# Patient Record
Sex: FEMALE | Race: WHITE | Employment: STUDENT | URBAN - METROPOLITAN AREA
[De-identification: names, ages, dates, MRNs, and addresses within clinical notes are randomized per-mention and may not be internally consistent; named-entity substitution may affect disease eponyms.]

---
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
## 2018-02-12 RX ORDER — SPIRONOLACTONE 50 MG/1
TABLET, FILM COATED ORAL
Qty: 60 TABLET | Refills: 0 | OUTPATIENT
Start: 2018-02-12

## 2018-07-09 ENCOUNTER — OFFICE VISIT (OUTPATIENT)
Dept: FAMILY MEDICINE | Facility: CLINIC | Age: 18
End: 2018-07-09
Payer: OTHER GOVERNMENT

## 2018-07-09 VITALS
TEMPERATURE: 98.5 F | HEIGHT: 67 IN | BODY MASS INDEX: 22.91 KG/M2 | RESPIRATION RATE: 20 BRPM | WEIGHT: 146 LBS | HEART RATE: 91 BPM | SYSTOLIC BLOOD PRESSURE: 111 MMHG | DIASTOLIC BLOOD PRESSURE: 74 MMHG

## 2018-07-09 DIAGNOSIS — Z00.00 VISIT FOR PREVENTIVE HEALTH EXAMINATION: Primary | ICD-10-CM

## 2018-07-09 DIAGNOSIS — Z30.41 ENCOUNTER FOR SURVEILLANCE OF CONTRACEPTIVE PILLS: ICD-10-CM

## 2018-07-09 DIAGNOSIS — Z11.3 SCREENING EXAMINATION FOR VENEREAL DISEASE: ICD-10-CM

## 2018-07-09 DIAGNOSIS — G43.009 MIGRAINE WITHOUT AURA AND WITHOUT STATUS MIGRAINOSUS, NOT INTRACTABLE: ICD-10-CM

## 2018-07-09 PROCEDURE — 87491 CHLMYD TRACH DNA AMP PROBE: CPT | Performed by: PHYSICIAN ASSISTANT

## 2018-07-09 PROCEDURE — 87591 N.GONORRHOEAE DNA AMP PROB: CPT | Performed by: PHYSICIAN ASSISTANT

## 2018-07-09 PROCEDURE — 99213 OFFICE O/P EST LOW 20 MIN: CPT | Mod: 25 | Performed by: PHYSICIAN ASSISTANT

## 2018-07-09 PROCEDURE — 99395 PREV VISIT EST AGE 18-39: CPT | Performed by: PHYSICIAN ASSISTANT

## 2018-07-09 RX ORDER — TIMOLOL MALEATE 5 MG/ML
1 SOLUTION/ DROPS OPHTHALMIC DAILY
Qty: 84 TABLET | Refills: 3 | Status: SHIPPED | OUTPATIENT
Start: 2018-07-09

## 2018-07-09 RX ORDER — SUMATRIPTAN 50 MG/1
50-100 TABLET, FILM COATED ORAL
Qty: 18 TABLET | Refills: 1 | Status: SHIPPED | OUTPATIENT
Start: 2018-07-09

## 2018-07-09 NOTE — MR AVS SNAPSHOT
After Visit Summary   7/9/2018    Paige E Schoenecker    MRN: [id number]           Patient Information     Date Of Birth          2000        Visit Information        Provider Department      7/9/2018 2:00 PM Kirby Diaz PA-C [facility]        Today's Diagnoses     Migraine without aura and without status migrainosus, not intractable    -  1    Encounter for surveillance of contraceptive pills        Screening examination for venereal disease          Care Instructions      Preventing Migraine Headaches: Triggers  The first step in preventing migraines is to learn what triggers them. You may then be able to control your triggers to avoid or reduce the severity of your migraines.  Know your triggers  Be aware that you may have more than one trigger, and that some triggers may work together. Common migraine triggers include:    Food and nutrition. Skipping meals or not drinking enough water can trigger headaches. So can certain foods, such as caffeine, monosodium glutamate (MSG), aged cheese, or sausage.    Alcohol. Red wine and other alcoholic beverages are common migraine triggers.    Chemicals. Scents, cleaning products, gasoline, glue, perfume, and paint can be triggers. So can tobacco smoke, including secondhand smoke.    Emotions. Stress can trigger headaches or make them worse once they begin.    Sleep disruption. Staying up late, sleeping late, and traveling across time zones can disrupt your sleep cycle, triggering headaches.    Hormones. Many women notice that migraines tend to happen at a certain point in their menstrual cycle. Birth control pills or hormone replacement therapy may also trigger migraines.    Environment and weather. Air travel, changes in altitude, air pressure changes, hot sun, or bright or flashing lights can be triggers.  Control your triggers  These are some of the things you can do to try to control triggers:    Avoid triggers if you  "can. For example, stay clear of alcohol and foods that trigger your headaches. Use unscented household products. Keep regular sleep habits. Manage stress to help control emotional triggers.    Change your behavior at times when triggers can't be avoided. For example, make sure to get enough rest and drink plenty of water while you're traveling. Make sure to carry a hat, sunglasses, and your medicines. Be alert for migraine symptoms, so you can treat a migraine early if it happens.  Date Last Reviewed: 10/9/2015    [id number] [employer]. [address] [place] [id number]. All rights reserved. This information is not intended as a substitute for professional medical care. Always follow your healthcare professional's instructions.                Follow-ups after your visit        Who to contact     If you have questions or need follow up information about today's clinic visit or your schedule please contact [facility] directly at [phone].  Normal or non-critical lab and imaging results will be communicated to you by MyChart, letter or phone within 4 business days after the clinic has received the results. If you do not hear from us within 7 days, please contact the clinic through [employer] or phone. If you have a critical or abnormal lab result, we will notify you by phone as soon as possible.  Submit refill requests through [employer] or call your pharmacy and they will forward the refill request to us. Please allow 3 business days for your refill to be completed.          Additional Information About Your Visit        Care EveryWhere ID     This is your Care EveryWhere ID. This could be used by other organizations to access your [place] medical records  [id number]        Your Vitals Were     Pulse Temperature Respirations Height Last Period BMI (Body Mass Index)    91 98.5  F (36.9  C) (Oral) 20 5' 7\" (1.702 m) 06/18/2018 (Approximate) 22.87 kg/m2       Blood Pressure from " Last 3 Encounters:   07/09/18 111/74   05/17/17 116/75   05/06/16 101/65    Weight from Last 3 Encounters:   07/09/18 146 lb (66.2 kg)   05/17/17 161 lb (73 kg) (89 %)*   05/06/16 127 lb (57.6 kg) (56 %)*     * Growth percentiles are based on [facility] 2-20 Years data.              We Performed the Following     CHLAMYDIA TRACHOMATIS PCR     NEISSERIA GONORRHOEA PCR          Today's Medication Changes          These changes are accurate as of 7/9/18  2:34 PM.  If you have any questions, ask your nurse or doctor.               Start taking these medicines.        Dose/Directions    SUMAtriptan 50 MG tablet   Commonly known as:  IMITREX   Used for:  Migraine without aura and without status migrainosus, not intractable   Started by:  Kirby Diaz PA-C        Dose:   mg   Take 1-2 tablets ( mg) by mouth at onset of headache for migraine May repeat in 2 hours. Max 4 tablets/24 hours.   Quantity:  18 tablet   Refills:  1         These medicines have changed or have updated prescriptions.        Dose/Directions    methylphenidate ER 36 MG CR tablet   Commonly known as:  CONCERTA   Indication:  2 tabs every day   This may have changed:  additional instructions   Used for:  Attention deficit disorder predominant inattentive type        Dose:  36 mg   Take 1 tablet (36 mg) by mouth every morning   Quantity:  30 tablet   Refills:  0         Stop taking these medicines if you haven't already. Please contact your care team if you have questions.     IBUPROFEN CHILDRENS PO   Stopped by:  Kirby Diaz PA-C           TYLENOL CHILDRENS PO   Stopped by:  Kirby Diaz PA-C                Where to get your medicines      These medications were sent to [id number] - LILIBETH JARVIS - [address] [facility]  AT [facility]  [address] [facility] [place] [address]     Phone:  [phone]     SUMAtriptan 50 MG tablet    VIENVA 0.1-20 MG-MCG per tablet                Primary Care  Provider Office Phone # Fax #    Jayjay Michaels [phone]       [address]  [facility] [address]        Equal Access to Services     EARLENE HINTON : Hadii emmie ku hadyosefo Soomaali, waaxda luqadaha, qaybta kaalmada adeegyada, betsy dillon laAlexandrhipolito orlando. So [facility] [phone].    ATENCIÓN: Si habla español, tiene a griffin disposición servicios gratuitos de asistencia lingüística. Llame al [phone].    We comply with applicable federal civil rights laws and [place] laws. We do not discriminate on the basis of race, color, national origin, age, disability, sex, sexual orientation, or gender identity.            Thank you!     Thank you for choosing [facility]  for your care. Our goal is always to provide you with excellent care. Hearing back from our patients is one way we can continue to improve our services. Please take a few minutes to complete the written survey that you may receive in the mail after your visit with us. Thank you!             Your Updated Medication List - Protect others around you: Learn how to safely use, store and throw away your medicines at www.disposemymeds.org.          This list is accurate as of 7/9/18  2:34 PM.  Always use your most recent med list.                   Brand Name Dispense Instructions for use Diagnosis    ACCUTANE PO      Take 30 mg by mouth 2 times daily        methylphenidate ER 36 MG CR tablet    CONCERTA    30 tablet    Take 1 tablet (36 mg) by mouth every morning    Attention deficit disorder predominant inattentive type       SUMAtriptan 50 MG tablet    IMITREX    18 tablet    Take 1-2 tablets ( mg) by mouth at onset of headache for migraine May repeat in 2 hours. Max 4 tablets/24 hours.    Migraine without aura and without status migrainosus, not intractable       VIENVA 0.1-20 MG-MCG per tablet   Generic drug:  levonorgestrel-ethinyl estradiol     84 tablet    Take 1 tablet by mouth daily     Encounter for surveillance of contraceptive pills

## 2018-07-09 NOTE — LETTER
July 10, 2018      Paige E Schoenecker  [address]  [place] [address]        Dear merlenejoann,    We are writing to inform you of your test results.    Your test results fall within the expected range(s) or remain unchanged from previous results.  Please continue with current treatment plan.    Resulted Orders   NEISSERIA GONORRHOEA PCR   Result Value Ref Range    Specimen Descrip Vagina     N Gonorrhea PCR Negative NEG^Negative      Comment:      Negative for N. gonorrhoeae rRNA by transcription mediated amplification.  A negative result by transcription mediated amplification does not preclude   the presence of N. gonorrhoeae infection because results are dependent on   proper and adequate collection, absence of inhibitors, and sufficient rRNA to   be detected.     CHLAMYDIA TRACHOMATIS PCR   Result Value Ref Range    Specimen Description Vagina     Chlamydia Trachomatis PCR Negative NEG^Negative      Comment:      Negative for C. trachomatis rRNA by transcription mediated amplification.  A negative result by transcription mediated amplification does not preclude   the presence of C. trachomatis infection because results are dependent on   proper and adequate collection, absence of inhibitors, and sufficient rRNA to   be detected.                               If you have any questions or concerns, please call the clinic at the number listed above.       Sincerely,        Kirby Diaz PA-C/AL

## 2018-07-09 NOTE — PROGRESS NOTES
SUBJECTIVE:   CC: Paige E Schoenecker is an 18 year old woman who presents for preventive health visit.     Physical   Annual:     Getting at least 3 servings of Calcium per day:  Yes    Bi-annual eye exam:  Yes    Dental care twice a year:  Yes    Sleep apnea or symptoms of sleep apnea:  None    Diet:  Regular (no restrictions) and Vegetarian/vegan    Taking medications regularly:  Yes    Medication side effects:  None    Additional concerns today:  No        Headache  Onset: x 2 weeks    Description:   Location: bilateral in the temporal area   Character: throbbing pain  Frequency:  1-2x per week  Duration:  Half day    Intensity: moderate    Progression of Symptoms:  same    Accompanying Signs & Symptoms:  Stiff neck: no   Neck or upper back pain: no   Fever: no   Sinus pressure: YES   Nausea or vomiting: YES-nausea  Dizziness: no   Numbness: no   Weakness: no but fatigue  Visual changes: no     History:   Head trauma: no   Family history of migraines: no   Previous tests for headaches: no   Neurologist evaluations: no   Able to do daily activities: no   Wake with a headaches: YES  Do headaches wake you up: no   Daily pain medication use: no   Work/school stressors/changes: no     Precipitating factors:   Does light make it worse: YES  Does sound make it worse: YES    Alleviating factors:  Does sleep help: YES    Therapies Tried and outcome: excedrin-with relief    History of migraines in the past while in high school.     Of note, currently lives alone in apartment. Symptoms occur mostly in the AM upon waking.       Today's PHQ-2 Score:   PHQ-2 ( 1999 [employer]) 7/9/2018   Q1: Little interest or pleasure in doing things 0   Q2: Feeling down, depressed or hopeless 0   PHQ-2 Score 0   Q1: Little interest or pleasure in doing things Not at all   Q2: Feeling down, depressed or hopeless Not at all   PHQ-2 Score 0       Abuse: Current or Past(Physical, Sexual or Emotional)- No  Do you feel safe in your environment -  Yes    Social History   Substance Use Topics     Smoking status: Passive Smoke Exposure - Never Smoker     Smokeless tobacco: Never Used      Comment: dad smokes but only with him every other weekend     Alcohol use No     Alcohol Use 7/9/2018   If you drink alcohol do you typically have greater than 3 drinks per day OR greater than 7 drinks per week? No       Reviewed orders with patient.  Reviewed health maintenance and updated orders accordingly - Yes  BP Readings from Last 3 Encounters:   07/09/18 111/74   05/17/17 116/75   05/06/16 101/65    Wt Readings from Last 3 Encounters:   07/09/18 146 lb (66.2 kg)   05/17/17 161 lb (73 kg) (89 %)*   05/06/16 127 lb (57.6 kg) (56 %)*     * Growth percentiles are based on [facility] 2-20 Years data.                  Patient Active Problem List   Diagnosis     Seasonal allergic rhinitis     Headache     Depression with anxiety     Attention deficit disorder predominant inattentive type     Other iron deficiency anemia     Vasovagal syncope     Migraine without aura and without status migrainosus, not intractable     Past Surgical History:   Procedure Laterality Date     TONSILLECTOMY         Social History   Substance Use Topics     Smoking status: Passive Smoke Exposure - Never Smoker     Smokeless tobacco: Never Used      Comment: dad smokes but only with him every other weekend     Alcohol use No     Family History   Problem Relation Age of Onset     Respiratory Other      cousin asthma     Thyroid Disease Mother      hypothyroid     Thyroid Disease Father      hypothyroid         Current Outpatient Prescriptions   Medication Sig Dispense Refill     ISOtretinoin (ACCUTANE PO) Take 30 mg by mouth 2 times daily       methylphenidate ER (CONCERTA) 36 MG CR tablet Take 1 tablet (36 mg) by mouth every morning (Patient taking differently: Take 36 mg by mouth every morning Patient only taking during school year) 30 tablet 0     SUMAtriptan (IMITREX) 50 MG tablet Take 1-2 tablets  "( mg) by mouth at onset of headache for migraine May repeat in 2 hours. Max 4 tablets/24 hours. 18 tablet 1     VIENVA 0.1-20 MG-MCG per tablet Take 1 tablet by mouth daily 84 tablet 3     [DISCONTINUED] VIENVA 0.1-20 MG-MCG per tablet Take 1 tablet by mouth daily 84 tablet 1     Allergies   Allergen Reactions     No Known Allergies        Mammogram not appropriate for this patient based on age.    Pertinent mammograms are reviewed under the imaging tab.  History of abnormal Pap smear: NO - under age 21, PAP not appropriate for age     Reviewed and updated as needed this visit by clinical staff  Tobacco  Allergies  Meds  Problems  Med Hx  Surg Hx  Fam Hx  Soc Hx          Reviewed and updated as needed this visit by Provider  Allergies  Meds  Problems        Past Medical History:   Diagnosis Date     Vesicoureteral reflux, unspecified or without reflux nephropathy       Past Surgical History:   Procedure Laterality Date     TONSILLECTOMY       Obstetric History     No data available          Review of Systems  CONSTITUTIONAL: NEGATIVE for fever, chills, change in weight  INTEGUMENTARU/SKIN: NEGATIVE for worrisome rashes, moles or lesions  EYES: NEGATIVE for vision changes or irritation  ENT: NEGATIVE for ear, mouth and throat problems  RESP: NEGATIVE for significant cough or SOB  BREAST: NEGATIVE for masses, tenderness or discharge  CV: NEGATIVE for chest pain, palpitations or peripheral edema  GI: NEGATIVE for nausea, abdominal pain, heartburn, or change in bowel habits  : NEGATIVE for unusual urinary or vaginal symptoms. Periods are regular.  MUSCULOSKELETAL: NEGATIVE for significant arthralgias or myalgia  NEURO: NEGATIVE for weakness, dizziness or paresthesias  PSYCHIATRIC: NEGATIVE for changes in mood or affect     OBJECTIVE:   /74 (BP Location: Right arm, Patient Position: Chair, Cuff Size: Adult Regular)  Pulse 91  Temp 98.5  F (36.9  C) (Oral)  Resp 20  Ht 5' 7\" (1.702 m)  Wt 146 " lb (66.2 kg)  LMP 06/18/2018 (Approximate)  BMI 22.87 kg/m2  Physical Exam  GENERAL: healthy, alert and no distress  EYES: Eyes grossly normal to inspection, PERRL and conjunctivae and sclerae normal  HENT: ear canals and TM's normal, nose and mouth without ulcers or lesions  NECK: no adenopathy, no asymmetry, masses, or scars and thyroid normal to palpation  RESP: lungs clear to auscultation - no rales, rhonchi or wheezes  CV: regular rate and rhythm, normal S1 S2, no S3 or S4, no murmur, click or rub, no peripheral edema and peripheral pulses strong  ABDOMEN: soft, nontender, no hepatosplenomegaly, no masses and bowel sounds normal  MS: no gross musculoskeletal defects noted, no edema  SKIN: no suspicious lesions or rashes  NEURO: Normal strength and tone, mentation intact and speech normal  PSYCH: mentation appears normal, affect normal/bright  LYMPH: no cervical adenopathy    Diagnostic Test Results:  none     ASSESSMENT/PLAN:   (Z00.00) Visit for preventive health examination  (primary encounter diagnosis)  Comment: normal exam.   Plan:     (Z30.41) Encounter for surveillance of contraceptive pills  Comment: stable. Yearly follow ups.   Plan: VIENVA 0.1-20 MG-MCG per tablet        -Medication use and side effects discussed with the patient. Patient is in complete understanding and agreement with plan.       (G43.009) Migraine without aura and without status migrainosus, not intractable  Comment: evident on history and exam. Discussed etiology and prevention measures as well as triggers. See patient instructions. imitrex prn. If symptoms persisting for 3 months, rtc to discuss possible prevention. Of note, unlikely but given symptoms in the AM and living alone, recommending checking carbon monoxide monitor at appt.    Plan: SUMAtriptan (IMITREX) 50 MG tablet,         OFFICE/OUTPT VISIT,EST,LEVL III        -Medication use and side effects discussed with the patient. Patient is in complete understanding and  "agreement with plan.       (Z11.3) Screening examination for venereal disease  Comment:   Plan: NEISSERIA GONORRHOEA PCR, CHLAMYDIA TRACHOMATIS        PCR              COUNSELING:  Reviewed preventive health counseling, as reflected in patient instructions       Regular exercise       Healthy diet/nutrition    BP Readings from Last 1 Encounters:   07/09/18 111/74     Estimated body mass index is 22.87 kg/(m^2) as calculated from the following:    Height as of this encounter: 5' 7\" (1.702 m).    Weight as of this encounter: 146 lb (66.2 kg).           reports that she is a non-smoker but has been exposed to tobacco smoke. She has never used smokeless tobacco.      Counseling Resources:  ATP IV Guidelines  Pooled Cohorts Equation Calculator  Breast Cancer Risk Calculator  FRAX Risk Assessment  ICSI Preventive Guidelines  Dietary Guidelines for Americans, 2010  USDA's MyPlate  ASA Prophylaxis  Lung CA Screening    Kirby Diaz PA-C  [facility]  Answers for HPI/ROS submitted by the patient on 7/9/2018   PHQ-2 Score: 0    "

## 2018-07-09 NOTE — PATIENT INSTRUCTIONS
Preventing Migraine Headaches: Triggers  The first step in preventing migraines is to learn what triggers them. You may then be able to control your triggers to avoid or reduce the severity of your migraines.  Know your triggers  Be aware that you may have more than one trigger, and that some triggers may work together. Common migraine triggers include:    Food and nutrition. Skipping meals or not drinking enough water can trigger headaches. So can certain foods, such as caffeine, monosodium glutamate (MSG), aged cheese, or sausage.    Alcohol. Red wine and other alcoholic beverages are common migraine triggers.    Chemicals. Scents, cleaning products, gasoline, glue, perfume, and paint can be triggers. So can tobacco smoke, including secondhand smoke.    Emotions. Stress can trigger headaches or make them worse once they begin.    Sleep disruption. Staying up late, sleeping late, and traveling across time zones can disrupt your sleep cycle, triggering headaches.    Hormones. Many women notice that migraines tend to happen at a certain point in their menstrual cycle. Birth control pills or hormone replacement therapy may also trigger migraines.    Environment and weather. Air travel, changes in altitude, air pressure changes, hot sun, or bright or flashing lights can be triggers.  Control your triggers  These are some of the things you can do to try to control triggers:    Avoid triggers if you can. For example, stay clear of alcohol and foods that trigger your headaches. Use unscented household products. Keep regular sleep habits. Manage stress to help control emotional triggers.    Change your behavior at times when triggers can't be avoided. For example, make sure to get enough rest and drink plenty of water while you're traveling. Make sure to carry a hat, sunglasses, and your medicines. Be alert for migraine symptoms, so you can treat a migraine early if it happens.  Date Last Reviewed: 10/9/2015    [phone]  [employer]. [address] [place] [address], [place] [address]. All rights reserved. This information is not intended as a substitute for professional medical care. Always follow your healthcare professional's instructions.

## 2018-07-10 LAB
C TRACH DNA SPEC QL NAA+PROBE: NEGATIVE
N GONORRHOEA DNA SPEC QL NAA+PROBE: NEGATIVE
SPECIMEN SOURCE: NORMAL
SPECIMEN SOURCE: NORMAL

## 2019-05-16 ENCOUNTER — OFFICE VISIT (OUTPATIENT)
Dept: URGENT CARE | Facility: URGENT CARE | Age: 19
End: 2019-05-16
Payer: COMMERCIAL

## 2019-05-16 ENCOUNTER — ANCILLARY PROCEDURE (OUTPATIENT)
Dept: GENERAL RADIOLOGY | Facility: CLINIC | Age: 19
End: 2019-05-16
Attending: FAMILY MEDICINE
Payer: COMMERCIAL

## 2019-05-16 VITALS
SYSTOLIC BLOOD PRESSURE: 118 MMHG | BODY MASS INDEX: 23.9 KG/M2 | DIASTOLIC BLOOD PRESSURE: 84 MMHG | WEIGHT: 152.6 LBS | OXYGEN SATURATION: 100 % | HEART RATE: 88 BPM | TEMPERATURE: 98.5 F

## 2019-05-16 DIAGNOSIS — M54.9 UPPER BACK PAIN: ICD-10-CM

## 2019-05-16 DIAGNOSIS — N89.8 VAGINAL DISCHARGE: ICD-10-CM

## 2019-05-16 DIAGNOSIS — R07.1 PAINFUL RESPIRATION: ICD-10-CM

## 2019-05-16 DIAGNOSIS — R07.9 CHEST PAIN, UNSPECIFIED TYPE: Primary | ICD-10-CM

## 2019-05-16 LAB
ANION GAP SERPL CALCULATED.3IONS-SCNC: 2 MMOL/L (ref 3–14)
BASOPHILS # BLD AUTO: 0 10E9/L (ref 0–0.2)
BASOPHILS NFR BLD AUTO: 0.2 %
BUN SERPL-MCNC: 11 MG/DL (ref 7–30)
CALCIUM SERPL-MCNC: 9 MG/DL (ref 8.5–10.1)
CHLORIDE SERPL-SCNC: 107 MMOL/L (ref 94–109)
CO2 SERPL-SCNC: 30 MMOL/L (ref 20–32)
CREAT SERPL-MCNC: 0.72 MG/DL (ref 0.52–1.04)
D DIMER PPP FEU-MCNC: 0.3 UG/ML FEU (ref 0–0.5)
DIFFERENTIAL METHOD BLD: ABNORMAL
EOSINOPHIL # BLD AUTO: 0.1 10E9/L (ref 0–0.7)
EOSINOPHIL NFR BLD AUTO: 1.2 %
ERYTHROCYTE [DISTWIDTH] IN BLOOD BY AUTOMATED COUNT: 14.2 % (ref 10–15)
GFR SERPL CREATININE-BSD FRML MDRD: >90 ML/MIN/{1.73_M2}
GLUCOSE SERPL-MCNC: 85 MG/DL (ref 70–99)
HCT VFR BLD AUTO: 44.8 % (ref 35–47)
HGB BLD-MCNC: 14.3 G/DL (ref 11.7–15.7)
LYMPHOCYTES # BLD AUTO: 1.6 10E9/L (ref 0.8–5.3)
LYMPHOCYTES NFR BLD AUTO: 26.8 %
MCH RBC QN AUTO: 26.7 PG (ref 26.5–33)
MCHC RBC AUTO-ENTMCNC: 31.9 G/DL (ref 31.5–36.5)
MCV RBC AUTO: 84 FL (ref 78–100)
MONOCYTES # BLD AUTO: 0.5 10E9/L (ref 0–1.3)
MONOCYTES NFR BLD AUTO: 8.8 %
NEUTROPHILS # BLD AUTO: 3.7 10E9/L (ref 1.6–8.3)
NEUTROPHILS NFR BLD AUTO: 63 %
PLATELET # BLD AUTO: 238 10E9/L (ref 150–450)
POTASSIUM SERPL-SCNC: 4.4 MMOL/L (ref 3.4–5.3)
RBC # BLD AUTO: 5.36 10E12/L (ref 3.8–5.2)
SODIUM SERPL-SCNC: 139 MMOL/L (ref 133–144)
SPECIMEN SOURCE: ABNORMAL
TROPONIN I SERPL-MCNC: <0.015 UG/L (ref 0–0.04)
WBC # BLD AUTO: 5.9 10E9/L (ref 4–11)
WET PREP SPEC: ABNORMAL

## 2019-05-16 PROCEDURE — 87210 SMEAR WET MOUNT SALINE/INK: CPT | Performed by: FAMILY MEDICINE

## 2019-05-16 PROCEDURE — 80048 BASIC METABOLIC PNL TOTAL CA: CPT | Performed by: FAMILY MEDICINE

## 2019-05-16 PROCEDURE — 99215 OFFICE O/P EST HI 40 MIN: CPT | Performed by: FAMILY MEDICINE

## 2019-05-16 PROCEDURE — 93000 ELECTROCARDIOGRAM COMPLETE: CPT | Performed by: FAMILY MEDICINE

## 2019-05-16 PROCEDURE — 85379 FIBRIN DEGRADATION QUANT: CPT | Performed by: FAMILY MEDICINE

## 2019-05-16 PROCEDURE — 85025 COMPLETE CBC W/AUTO DIFF WBC: CPT | Performed by: FAMILY MEDICINE

## 2019-05-16 PROCEDURE — 36415 COLL VENOUS BLD VENIPUNCTURE: CPT | Performed by: FAMILY MEDICINE

## 2019-05-16 PROCEDURE — 71046 X-RAY EXAM CHEST 2 VIEWS: CPT

## 2019-05-16 PROCEDURE — 84484 ASSAY OF TROPONIN QUANT: CPT | Performed by: FAMILY MEDICINE

## 2019-05-16 RX ORDER — NAPROXEN 500 MG/1
500 TABLET ORAL 2 TIMES DAILY WITH MEALS
Qty: 14 TABLET | Refills: 0 | Status: SHIPPED | OUTPATIENT
Start: 2019-05-16 | End: 2019-05-23

## 2019-05-16 RX ORDER — FLUCONAZOLE 150 MG/1
150 TABLET ORAL ONCE
Qty: 1 TABLET | Refills: 0 | Status: SHIPPED | OUTPATIENT
Start: 2019-05-16 | End: 2019-05-16

## 2019-05-16 NOTE — PROGRESS NOTES
SUBJECTIVE: Paige E Schoenecker is a 18 year old female presenting with a chief complaint of Chest ain and painful respiration and upper back pain. Also vaginal dc  Onset of symptoms was day(s) ago.  Course of illness is worsening.    Severity moderate  Current and Associated symptoms: none  Treatment measures tried include None tried.  Predisposing factors include None.    Past Medical History:   Diagnosis Date     Vesicoureteral reflux, unspecified or without reflux nephropathy        Past Surgical History:   Procedure Laterality Date     TONSILLECTOMY         Family History   Problem Relation Age of Onset     Respiratory Other         cousin asthma     Thyroid Disease Mother         hypothyroid     Thyroid Disease Father         hypothyroid       Social History     Tobacco Use     Smoking status: Passive Smoke Exposure - Never Smoker     Smokeless tobacco: Never Used     Tobacco comment: dad smokes but only with him every other weekend   Substance Use Topics     Alcohol use: No        Allergies   Allergen Reactions     No Known Allergies      Review Of Systems  Skin: negative  Eyes: negative  Ears/Nose/Throat: negative  Respiratory: No shortness of breath, dyspnea on exertion, cough, or hemoptysis  Cardiovascular: as above  Gastrointestinal: negative  Genitourinary: negative  Musculoskeletal: negative  Neurologic: negative  Psychiatric: negative    OBJECTIVE:  /84   Pulse 88   Temp 98.5  F (36.9  C) (Oral)   Wt 69.2 kg (152 lb 9.6 oz)   SpO2 100%   BMI 23.90 kg/m     GENERAL APPEARANCE: healthy, alert and no distress  EYES: EOMI,  PERRL, conjunctiva clear  HENT: ear canals and TM's normal.  Nose and mouth without ulcers, erythema or lesions  NECK: supple, nontender, no lymphadenopathy  RESP: lungs clear to auscultation - no rales, rhonchi or wheezes  CV: regular rates and rhythm, normal S1 S2, no murmur noted  ABDOMEN:  soft, nontender, no HSM or masses and bowel sounds normal  NEURO: Normal strength  and tone, sensory exam grossly normal,  normal speech and mentation  SKIN: no suspicious lesions or rashes    Xray without acute findings, no pneumonia/Pnemothorax read by Shen Arredondo D.O.        ICD-10-CM    1. Chest pain, unspecified type R07.9 CBC with platelets differential     D dimer quantitative     Troponin I     Basic metabolic panel     EKG 12-lead complete w/read - Clinics     XR Chest 2 Views     naproxen (NAPROSYN) 500 MG tablet   2. Painful respiration R07.1 CBC with platelets differential     D dimer quantitative     Troponin I     Basic metabolic panel     EKG 12-lead complete w/read - Clinics     XR Chest 2 Views     naproxen (NAPROSYN) 500 MG tablet   3. Vaginal discharge N89.8 Wet prep     fluconazole (DIFLUCAN) 150 MG tablet   4. Upper back pain M54.9 CBC with platelets differential     D dimer quantitative     Troponin I     Basic metabolic panel     EKG 12-lead complete w/read - Clinics     XR Chest 2 Views     naproxen (NAPROSYN) 500 MG tablet     No MI/PE by labs/EKG  Ice/rest  ED if worse

## 2022-09-08 ENCOUNTER — MYC REFILL (OUTPATIENT)
Dept: FAMILY MEDICINE | Facility: CLINIC | Age: 22
End: 2022-09-08

## 2022-09-08 DIAGNOSIS — F98.8 ATTENTION DEFICIT DISORDER PREDOMINANT INATTENTIVE TYPE: ICD-10-CM

## 2022-09-09 RX ORDER — METHYLPHENIDATE HYDROCHLORIDE 36 MG/1
36 TABLET ORAL EVERY MORNING
Qty: 30 TABLET | Refills: 0 | Status: SHIPPED | OUTPATIENT
Start: 2022-09-09

## 2022-10-23 ENCOUNTER — HEALTH MAINTENANCE LETTER (OUTPATIENT)
Age: 22
End: 2022-10-23

## 2023-11-05 ENCOUNTER — HEALTH MAINTENANCE LETTER (OUTPATIENT)
Age: 23
End: 2023-11-05